# Patient Record
Sex: FEMALE | Race: WHITE | NOT HISPANIC OR LATINO | URBAN - METROPOLITAN AREA
[De-identification: names, ages, dates, MRNs, and addresses within clinical notes are randomized per-mention and may not be internally consistent; named-entity substitution may affect disease eponyms.]

---

## 2017-02-14 ENCOUNTER — IMPORTED ENCOUNTER (OUTPATIENT)
Dept: URBAN - METROPOLITAN AREA CLINIC 9 | Facility: CLINIC | Age: 67
End: 2017-02-14

## 2017-02-27 ENCOUNTER — IMPORTED ENCOUNTER (OUTPATIENT)
Dept: URBAN - METROPOLITAN AREA CLINIC 9 | Facility: CLINIC | Age: 67
End: 2017-02-27

## 2017-08-15 ENCOUNTER — IMPORTED ENCOUNTER (OUTPATIENT)
Dept: URBAN - METROPOLITAN AREA CLINIC 9 | Facility: CLINIC | Age: 67
End: 2017-08-15

## 2018-02-08 ENCOUNTER — IMPORTED ENCOUNTER (OUTPATIENT)
Dept: URBAN - METROPOLITAN AREA CLINIC 9 | Facility: CLINIC | Age: 68
End: 2018-02-08

## 2018-03-27 ENCOUNTER — IMPORTED ENCOUNTER (OUTPATIENT)
Dept: URBAN - METROPOLITAN AREA CLINIC 9 | Facility: CLINIC | Age: 68
End: 2018-03-27

## 2018-06-08 NOTE — PATIENT DISCUSSION
Mac OCT done today to evaluate. Spoke to mom today and she gave me the fax number  Note was faxed to [facility]

## 2018-09-25 ENCOUNTER — IMPORTED ENCOUNTER (OUTPATIENT)
Dept: URBAN - METROPOLITAN AREA CLINIC 9 | Facility: CLINIC | Age: 68
End: 2018-09-25

## 2019-11-21 ENCOUNTER — IMPORTED ENCOUNTER (OUTPATIENT)
Dept: URBAN - METROPOLITAN AREA CLINIC 9 | Facility: CLINIC | Age: 69
End: 2019-11-21

## 2020-03-04 ENCOUNTER — IMPORTED ENCOUNTER (OUTPATIENT)
Dept: URBAN - METROPOLITAN AREA CLINIC 9 | Facility: CLINIC | Age: 70
End: 2020-03-04

## 2020-09-16 ENCOUNTER — IMPORTED ENCOUNTER (OUTPATIENT)
Dept: URBAN - METROPOLITAN AREA CLINIC 9 | Facility: CLINIC | Age: 70
End: 2020-09-16

## 2021-07-15 ENCOUNTER — IMPORTED ENCOUNTER (OUTPATIENT)
Dept: URBAN - METROPOLITAN AREA CLINIC 9 | Facility: CLINIC | Age: 71
End: 2021-07-15

## 2021-07-27 ENCOUNTER — IMPORTED ENCOUNTER (OUTPATIENT)
Dept: URBAN - METROPOLITAN AREA CLINIC 9 | Facility: CLINIC | Age: 71
End: 2021-07-27

## 2021-08-16 ENCOUNTER — IMPORTED ENCOUNTER (OUTPATIENT)
Dept: URBAN - METROPOLITAN AREA CLINIC 9 | Facility: CLINIC | Age: 71
End: 2021-08-16

## 2021-08-23 ENCOUNTER — IMPORTED ENCOUNTER (OUTPATIENT)
Dept: URBAN - METROPOLITAN AREA CLINIC 9 | Facility: CLINIC | Age: 71
End: 2021-08-23

## 2021-08-30 ENCOUNTER — IMPORTED ENCOUNTER (OUTPATIENT)
Dept: URBAN - METROPOLITAN AREA CLINIC 9 | Facility: CLINIC | Age: 71
End: 2021-08-30

## 2021-09-27 ENCOUNTER — IMPORTED ENCOUNTER (OUTPATIENT)
Dept: URBAN - METROPOLITAN AREA CLINIC 9 | Facility: CLINIC | Age: 71
End: 2021-09-27

## 2021-10-15 ASSESSMENT — TONOMETRY
OD_IOP_MMHG: 16
OD_IOP_MMHG: 14
OD_IOP_MMHG: 14
OD_IOP_MMHG: 16
OS_IOP_MMHG: 16
OD_IOP_MMHG: 18
OD_IOP_MMHG: 14
OD_IOP_MMHG: 18
OD_IOP_MMHG: 17
OS_IOP_MMHG: 16
OD_IOP_MMHG: 12
OD_IOP_MMHG: 16
OS_IOP_MMHG: 15
OS_IOP_MMHG: 16
OS_IOP_MMHG: 10
OS_IOP_MMHG: 14
OD_IOP_MMHG: 20
OS_IOP_MMHG: 17
OS_IOP_MMHG: 14
OS_IOP_MMHG: 16
OS_IOP_MMHG: 20
OS_IOP_MMHG: 20

## 2021-10-15 ASSESSMENT — VISUAL ACUITY
OS_CC: 20/50 -2 SN
OD_CC: 20/60 SN
OD_CC: 20/50 SN
OD_SC: 20/50 - SN
OD_CC: 20/200 SN
OS_CC: 20/50 -2 SN
OS_CC: 20/20 SN
OS_PH: 20/50 SN
OD_PH: 20/60 SN
OS_CC: 20/25 -2 SN
OS_SC: 20/80 + SN
OS_SC: 20/60 SN
OD_CC: 20/30 -2 SN
OD_CC: 20/25 - SN
OD_CC: 20/30 - SN
OS_CC: 20/50 - SN
OD_PH: 20/60 + SN
OS_CC: 20/30 SN
OS_CC: 20/25 -2 SN
OS_CC: 20/50 - SN
OS_CC: 20/25 + SN
OD_CC: 20/30 + SN
OD_CC: 20/50 SN
OD_CC: 20/400 SN
OS_CC: 20/30 - SN
OS_CC: 20/70 SN
OS_CC: 20/20 - SN
OS_CC: 20/25 -2 SN
OD_SC: 20/300 SN
OD_PH: 20/60 SN
OD_CC: 20/300 SN
OD_CC: 20/25 -2 SN
OD_CC: 20/70 SN
OD_CC: 20/40 -2 SN
OD_CC: 20/60 + SN
OS_CC: 20/30 SN
OD_PH: 20/60 + SN

## 2022-01-11 ENCOUNTER — ESTABLISHED PATIENT (OUTPATIENT)
Dept: URBAN - METROPOLITAN AREA CLINIC 14 | Facility: CLINIC | Age: 72
End: 2022-01-11

## 2022-01-11 DIAGNOSIS — H35.371: ICD-10-CM

## 2022-01-11 DIAGNOSIS — H04.123: ICD-10-CM

## 2022-01-11 DIAGNOSIS — H25.13: ICD-10-CM

## 2022-01-11 DIAGNOSIS — H40.1131: ICD-10-CM

## 2022-01-11 PROCEDURE — 92133 CPTRZD OPH DX IMG PST SGM ON: CPT

## 2022-01-11 PROCEDURE — 92015 DETERMINE REFRACTIVE STATE: CPT

## 2022-01-11 PROCEDURE — 99214 OFFICE O/P EST MOD 30 MIN: CPT

## 2022-01-11 ASSESSMENT — VISUAL ACUITY
OD_CC: 20/400
OS_CC: 20/60

## 2022-01-11 ASSESSMENT — TONOMETRY
OS_IOP_MMHG: 14
OD_IOP_MMHG: 13

## 2022-02-02 ENCOUNTER — PRE-OP/H&P (OUTPATIENT)
Dept: URBAN - METROPOLITAN AREA CLINIC 14 | Facility: CLINIC | Age: 72
End: 2022-02-02

## 2022-02-02 DIAGNOSIS — H25.13: ICD-10-CM

## 2022-02-02 DIAGNOSIS — H04.123: ICD-10-CM

## 2022-02-02 DIAGNOSIS — H35.371: ICD-10-CM

## 2022-02-02 PROCEDURE — 99199ADVT ADVANCED VISION TESTING

## 2022-02-02 PROCEDURE — 92136 OPHTHALMIC BIOMETRY: CPT

## 2022-02-02 PROCEDURE — 99211PRE PRE OP VISIT

## 2022-02-10 PROBLEM — Z98.890: Noted: 2022-02-10

## 2022-02-11 ENCOUNTER — POST-OP (OUTPATIENT)
Dept: URBAN - METROPOLITAN AREA CLINIC 9 | Facility: CLINIC | Age: 72
End: 2022-02-11

## 2022-02-11 DIAGNOSIS — H25.12: ICD-10-CM

## 2022-02-11 DIAGNOSIS — Z98.890: ICD-10-CM

## 2022-02-11 PROCEDURE — 92136 OPHTHALMIC BIOMETRY: CPT

## 2022-02-11 PROCEDURE — 99024 POSTOP FOLLOW-UP VISIT: CPT

## 2022-02-11 ASSESSMENT — VISUAL ACUITY
OS_SC: 20/50
OU_SC: 20/30-1
OS_PH: 20/30
OD_SC: 20/25-2

## 2022-02-11 ASSESSMENT — TONOMETRY
OS_IOP_MMHG: 14
OD_IOP_MMHG: 17

## 2022-02-25 ENCOUNTER — POST-OP (OUTPATIENT)
Dept: URBAN - METROPOLITAN AREA CLINIC 9 | Facility: CLINIC | Age: 72
End: 2022-02-25

## 2022-02-25 DIAGNOSIS — Z98.890: ICD-10-CM

## 2022-02-25 PROCEDURE — 99024 POSTOP FOLLOW-UP VISIT: CPT

## 2022-02-25 ASSESSMENT — VISUAL ACUITY
OS_SC: 20/25
OD_SC: 20/30-2

## 2022-02-25 ASSESSMENT — TONOMETRY
OS_IOP_MMHG: 16
OD_IOP_MMHG: 16

## 2022-03-22 ENCOUNTER — POST-OP (OUTPATIENT)
Dept: URBAN - METROPOLITAN AREA CLINIC 14 | Facility: CLINIC | Age: 72
End: 2022-03-22

## 2022-03-22 DIAGNOSIS — Z98.890: ICD-10-CM

## 2022-03-22 PROCEDURE — 99024 POSTOP FOLLOW-UP VISIT: CPT

## 2022-03-22 ASSESSMENT — VISUAL ACUITY
OD_SC: 20/30
OS_SC: 20/25

## 2022-03-22 ASSESSMENT — TONOMETRY
OS_IOP_MMHG: 12
OD_IOP_MMHG: 12

## 2022-06-30 RX ORDER — IPRATROPIUM BROMIDE 21 UG/1
SPRAY, METERED NASAL
COMMUNITY

## 2022-06-30 RX ORDER — FUROSEMIDE 20 MG/1
TABLET ORAL
COMMUNITY

## 2022-06-30 RX ORDER — LEVOCETIRIZINE DIHYDROCHLORIDE 5 MG/1
TABLET, FILM COATED ORAL
COMMUNITY

## 2022-06-30 RX ORDER — MESALAMINE 4 G/60ML
ENEMA RECTAL
COMMUNITY

## 2022-06-30 RX ORDER — CAPTOPRIL 50 MG/1
TABLET ORAL
COMMUNITY

## 2022-06-30 RX ORDER — MONTELUKAST SODIUM 10 MG/1
TABLET ORAL
COMMUNITY

## 2024-08-22 NOTE — PATIENT DISCUSSION
"ED/Discharge Protocol      \"I see that you were in the (ER/UC/IP) on 8/19.    How are you doing now that you are home?\" Im better, cardiology appointment 9/3. Taking medications as advised.     Is patient experiencing symptoms that may require a hospital visit?  No, diarrhea from Magnesium, decreased now that she is taking with food and BRAT diet. Also took Imodium.     Discharge Instructions    \"Let's review your discharge instructions.  What is/are the follow-up recommendations?  Pt. Response: follow up with PCP, cardiology, oncology    \"Were you instructed to make a follow-up appointment?\"  Pt. Response: Yes.  Has appointment been made?   Yes      \"When you see the provider, I would recommend that you bring your discharge instructions with you.    Medications    \"How many new medications are you on since your hospitalization/ED visit?\"    2 or more - [facility] MTM referral needed  \"How many of your current medicines changed (dose, timing, name, etc.) while you were in the hospital/ED visit?\"   0-1  \"Do you have questions about your medications?\"   Yes (and cannot be easily answered) - Epic MTM referral needed  \"Were you newly diagnosed with heart failure, COPD, diabetes or did you have a heart attack?\"   No  For patients on insulin: \"Did you start on insulin in the hospital or did you have your insulin dose changed?\"   No  Post Discharge Medication Reconciliation Status: patient was not discharged from an inpatient facility or TCU.    Was MTM referral placed (*Make sure to put transitions as reason for referral)?   Yes - \"The Medication Therapy  will call you within the next few days to schedule an appointment with an MTM pharmacist to discuss your medicines and make sure they are working as well as they possibly can for you. This visit can be done in an [facility] clinic or on the phone and is at no charge to you.\"    Call Summary    \"Do you have any questions or concerns about your " Co-managed patient - cleared for return to . "condition or care plan at the moment?\"    No  Triage nurse advice given: BRAT diet, push fluids, scheduled with PCP, keep cardiology appointment, ER if symptoms return    Patient was in ER 1 time in the past year (assess appropriateness of ER visits.)      Loraine Mcguire RN on 8/22/2024 at 10:59 AM    "